# Patient Record
Sex: FEMALE | Race: WHITE | NOT HISPANIC OR LATINO | URBAN - NONMETROPOLITAN AREA
[De-identification: names, ages, dates, MRNs, and addresses within clinical notes are randomized per-mention and may not be internally consistent; named-entity substitution may affect disease eponyms.]

---

## 2019-02-20 ENCOUNTER — IMPORTED ENCOUNTER (OUTPATIENT)
Dept: URBAN - NONMETROPOLITAN AREA CLINIC 1 | Facility: CLINIC | Age: 59
End: 2019-02-20

## 2019-02-20 PROBLEM — H25.813: Noted: 2018-02-14

## 2019-02-20 PROBLEM — H52.4: Noted: 2017-11-01

## 2019-02-20 PROBLEM — H52.03: Noted: 2019-02-20

## 2019-02-20 PROBLEM — H40.1131: Noted: 2017-11-01

## 2019-02-20 PROBLEM — H04.123: Noted: 2019-02-20

## 2019-02-20 PROCEDURE — 92014 COMPRE OPH EXAM EST PT 1/>: CPT

## 2019-02-20 PROCEDURE — 92015 DETERMINE REFRACTIVE STATE: CPT

## 2019-02-20 NOTE — PATIENT DISCUSSION
COAG-IOPs 22:20-Appears stable. Reviewed VF from 01/22/18 with patient. -Continue Latanoprost OU qhs as directed. Stressed importance of compliance. (handwritten rx given) -OCT [facility] performed 10/24/19 and reviewed with the patient. Stable OU. Cataract OU-Reviewed symptoms of advancing cataract growth such as glare and halos and decreased vision.-Continue to monitor for now. Pt will notify us if any new symptoms develop. Presbyopia/Hyperopia-specs rx given FUENTES-Explained FUENTES and associated symptoms.-Recommend increasing Omega 3s.-Pt to begin artificial tears Refresh Repair OU QID PRN. -Start Pataday OU qday if eyes feel itchy. - Pt will contact us if this does not provide relief. Consider punctal plugs in that case. 11/1/17  IOP 22:2  Va cc OD 20/25 OS 20/30 refracted to 20/20 OD & OS2/14/18  IOP 22:22  Va cc OD 20/20 OS 20/206/20/18  IOP 21:21  Va cc OD 20/20 OS 20/2010/24/18 IOP 23:22 Va cc OD 20/20 OS 20/202/20/19 IOP 22:20 Va cc OD 20/20 OS 20/20RTC: 4 mo VA & Jacob; 's Notes: Teena Mata

## 2019-07-17 ENCOUNTER — IMPORTED ENCOUNTER (OUTPATIENT)
Dept: URBAN - NONMETROPOLITAN AREA CLINIC 1 | Facility: CLINIC | Age: 59
End: 2019-07-17

## 2019-07-17 PROBLEM — H40.1131: Noted: 2019-07-17

## 2019-07-17 PROBLEM — H52.4: Noted: 2019-07-17

## 2019-07-17 PROBLEM — H52.03: Noted: 2019-07-17

## 2019-07-17 PROBLEM — H04.123: Noted: 2019-07-17

## 2019-07-17 PROBLEM — H25.813: Noted: 2019-07-17

## 2019-07-17 PROBLEM — H00.15: Noted: 2019-07-17

## 2019-07-17 PROCEDURE — 99212 OFFICE O/P EST SF 10 MIN: CPT

## 2019-07-17 NOTE — PATIENT DISCUSSION
Chalazion LLL-Discussed findings of exam in detail with the patient.-Discussed the nature of the condition and treatment options with the patient.-The patient was instructed on the use of warm compresses.-The patient was instructed on the use of lid scrubs. -Discussed surgical incision and drainage of the chalazion.-The patient chooses surgical intervention. COAG-IOP 20OU 07/17/19-Appears stable. -Continue Latanoprost OU qhs as directed. -Stressed importance of compliance. -Continue to monitor. -Order VFCataract OU-Reviewed symptoms of advancing cataract growth such as glare and halos and decreased vision.-Continue to monitor for now. Pt will notify us if any new symptoms develop. FUENTES-Explained FUENTES and associated symptoms.-Recommend increasing Omega 3s.-Pt to begin artificial tears Refresh Repair OU QID PRN. -Start Pataday OU qday if eyes feel itchy. - Pt will contact us if this does not provide relief. Consider punctal plugs in that case. RTC: 4 mo IOP check VF; 's Notes: Ursula Harp

## 2019-11-13 ENCOUNTER — IMPORTED ENCOUNTER (OUTPATIENT)
Dept: URBAN - NONMETROPOLITAN AREA CLINIC 1 | Facility: CLINIC | Age: 59
End: 2019-11-13

## 2019-11-13 PROCEDURE — 92083 EXTENDED VISUAL FIELD XM: CPT

## 2019-11-13 PROCEDURE — 99213 OFFICE O/P EST LOW 20 MIN: CPT

## 2019-11-13 NOTE — PATIENT DISCUSSION
COAG-IOP 20OU 11/13/19-Appears stable. -Continue Latanoprost OU qhs as directed. -Stressed importance of compliance. -Continue to monitor. -VF performed and reviewed w/pt -Order OCT ONHCataract OU-Reviewed symptoms of advancing cataract growth such as glare and halos and decreased vision.-Continue to monitor for now. Pt will notify us if any new symptoms develop. FUENTES-Explained FUENTES and associated symptoms.-Recommend increasing Omega 3s.-Pt to begin artificial tears Refresh Repair OU QID PRN. -Start Pataday OU qday if eyes feel itchy. - Pt will contact us if this does not provide relief. Consider punctal plugs in that case. -Recommend dexomethosone aki for itching unable to get recommend dandruff shampoo for eyebrows when showeringRTC 4 mo CEE OCT ONH; 's Notes: Teena Mata

## 2019-11-14 PROBLEM — H40.1131: Noted: 2019-11-14

## 2019-11-14 PROBLEM — H25.813: Noted: 2019-11-14

## 2019-11-14 PROBLEM — H52.4: Noted: 2019-11-14

## 2019-11-14 PROBLEM — H02.821: Noted: 2021-10-07

## 2019-11-14 PROBLEM — H52.03: Noted: 2019-11-14

## 2019-11-14 PROBLEM — H04.123: Noted: 2019-11-14

## 2020-11-25 ENCOUNTER — IMPORTED ENCOUNTER (OUTPATIENT)
Dept: URBAN - NONMETROPOLITAN AREA CLINIC 1 | Facility: CLINIC | Age: 60
End: 2020-11-25

## 2020-11-25 PROCEDURE — 92015 DETERMINE REFRACTIVE STATE: CPT

## 2020-11-25 PROCEDURE — 92133 CPTRZD OPH DX IMG PST SGM ON: CPT

## 2020-11-25 PROCEDURE — 92014 COMPRE OPH EXAM EST PT 1/>: CPT

## 2020-11-25 NOTE — PATIENT DISCUSSION
COAG-IOP at 22 OD and 19 OS. -Appears stable. -Continue Latanoprost OU qhs as directed. -Stressed importance of compliance. -Continue to monitor. -OCT done today; stable from previous.-Order VF Cataract OU-Reviewed symptoms of advancing cataract growth such as glare and halos and decreased vision.-Continue to monitor for now. Pt will notify us if any new symptoms develop. FUENTES-Explained FUENTES and associated symptoms.-Recommend increasing Omega 3s.-Pt to begin artificial tears Refresh Repair OU QID PRN. -Start Pataday OU qday if eyes feel itchy. - Pt will contact us if this does not provide relief. Consider punctal plugs in that case. -Recommend dexomethosone aki for itching unable to get recommend dandruff shampoo for eyebrows when showeringPresbyopia OU- Discussed refractive status in detail with patient. - new glasses Rx given tody.; 's Notes: Michel Callahan

## 2021-03-16 ENCOUNTER — IMPORTED ENCOUNTER (OUTPATIENT)
Dept: URBAN - NONMETROPOLITAN AREA CLINIC 1 | Facility: CLINIC | Age: 61
End: 2021-03-16

## 2021-03-16 PROCEDURE — 92083 EXTENDED VISUAL FIELD XM: CPT

## 2021-03-16 PROCEDURE — 92012 INTRM OPH EXAM EST PATIENT: CPT

## 2021-03-16 NOTE — PATIENT DISCUSSION
COAG-IOP 22 OU 03/16/21-Appears stable. -Continue Latanoprost OU qhs as directed. -Stressed importance of compliance. -Continue to monitor. -VF performed and reviewed w/ptCataract OU-Reviewed symptoms of advancing cataract growth such as glare and halos and decreased vision.-Continue to monitor for now. Pt will notify us if any new symptoms develop. FUENTES-Explained FUENTES and associated symptoms.-Recommend increasing Omega 3s.-Pt to begin artificial tears Refresh Repair OU QID PRN. -Start Pataday OU qday if eyes feel itchy. - Pt will contact us if this does not provide relief. Consider punctal plugs in that case. -Recommend dexomethosone aki for itching unable to get recommend dandruff shampoo for eyebrows when showeringPresbyopia OU- Discussed refractive status in detail with patient. - new glasses Rx given tody.; 's Notes: Ki Eid

## 2021-10-07 ENCOUNTER — IMPORTED ENCOUNTER (OUTPATIENT)
Dept: URBAN - NONMETROPOLITAN AREA CLINIC 1 | Facility: CLINIC | Age: 61
End: 2021-10-07

## 2021-10-07 PROCEDURE — 92133 CPTRZD OPH DX IMG PST SGM ON: CPT

## 2021-10-07 PROCEDURE — 92014 COMPRE OPH EXAM EST PT 1/>: CPT

## 2021-10-07 PROCEDURE — 76514 ECHO EXAM OF EYE THICKNESS: CPT

## 2021-10-07 PROCEDURE — 92015 DETERMINE REFRACTIVE STATE: CPT

## 2021-10-07 NOTE — PATIENT DISCUSSION
COAG-IOP stable 22/21-Continue Latanoprost OU qhs as directed. -Stressed importance of compliance. -Continue to monitor. -[facility] OCT performed and reviewed stableCataract OU-Reviewed symptoms of advancing cataract growth such as glare and halos and decreased vision.-Continue to monitor for now. Pt will notify us if any new symptoms develop. Cyst RUL-No treatment necessary monitor. Presbyopia OU- Discussed refractive status in detail with patient. - new glasses Rx given tody.; 's Notes: Adonis Jordan

## 2022-04-09 ASSESSMENT — VISUAL ACUITY
OD_SC: 20/20
OD_SC: 20/25+2
OD_SC: 20/20
OD_SC: 20/20
OS_SC: 20/20
OD_SC: 20/20
OD_SC: 20/20

## 2022-04-09 ASSESSMENT — TONOMETRY
OD_IOP_MMHG: 20
OD_IOP_MMHG: 22
OS_IOP_MMHG: 20
OD_IOP_MMHG: 22
OD_IOP_MMHG: 20
OS_IOP_MMHG: 21
OS_IOP_MMHG: 20
OS_IOP_MMHG: 22
OS_IOP_MMHG: 20
OD_IOP_MMHG: 22
OS_IOP_MMHG: 19
OD_IOP_MMHG: 22

## 2022-04-09 ASSESSMENT — PACHYMETRY
OS_CT_UM: 536; ADJ: THIN; INDICATION: 365.11	GLAUCOMA OPEN P
OD_CT_UM: 540; ADJ: THIN; INDICATION: 365.11	GLAUCOMA OPEN P

## 2022-05-09 ENCOUNTER — ESTABLISHED PATIENT (OUTPATIENT)
Dept: RURAL CLINIC 2 | Facility: CLINIC | Age: 62
End: 2022-05-09

## 2022-05-09 ENCOUNTER — EMERGENCY VISIT (OUTPATIENT)
Dept: RURAL CLINIC 2 | Facility: CLINIC | Age: 62
End: 2022-05-09

## 2022-05-09 DIAGNOSIS — H40.1131: ICD-10-CM

## 2022-05-09 DIAGNOSIS — H25.813: ICD-10-CM

## 2022-05-09 PROCEDURE — 92012 INTRM OPH EXAM EST PATIENT: CPT

## 2022-05-09 PROCEDURE — 99213 OFFICE O/P EST LOW 20 MIN: CPT

## 2022-05-09 PROCEDURE — 92083 EXTENDED VISUAL FIELD XM: CPT

## 2022-05-09 ASSESSMENT — VISUAL ACUITY
OS_CC: 20/20
OS_CC: 20/20
OD_PH: 20/40
OD_CC: 20/80
OD_CC: 20/20

## 2022-05-09 ASSESSMENT — TONOMETRY
OD_IOP_MMHG: 16
OS_IOP_MMHG: 17

## 2022-05-09 NOTE — PATIENT DISCUSSION
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  Start ALREX TID OU until 2 sample bottles gone and REFRESH RELIEVA PF q2hrs.  Call back if NI within 24 hrs.

## 2022-11-21 ENCOUNTER — ESTABLISHED PATIENT (OUTPATIENT)
Dept: RURAL CLINIC 2 | Facility: CLINIC | Age: 62
End: 2022-11-21

## 2022-11-21 DIAGNOSIS — H52.223: ICD-10-CM

## 2022-11-21 DIAGNOSIS — H40.1131: ICD-10-CM

## 2022-11-21 DIAGNOSIS — H52.4: ICD-10-CM

## 2022-11-21 DIAGNOSIS — H52.03: ICD-10-CM

## 2022-11-21 DIAGNOSIS — H25.813: ICD-10-CM

## 2022-11-21 PROCEDURE — 92015 DETERMINE REFRACTIVE STATE: CPT

## 2022-11-21 PROCEDURE — 92014 COMPRE OPH EXAM EST PT 1/>: CPT

## 2022-11-21 PROCEDURE — 92133 CPTRZD OPH DX IMG PST SGM ON: CPT

## 2022-11-21 ASSESSMENT — VISUAL ACUITY
OS_CC: 20/20
OD_CC: 20/20

## 2022-11-21 ASSESSMENT — TONOMETRY
OD_IOP_MMHG: 21
OS_IOP_MMHG: 21

## 2022-11-21 NOTE — PATIENT DISCUSSION
Patient given Rx for glasses.  Wants new RX as close to possible as old (feels most recent was too strong).

## 2023-05-22 ENCOUNTER — ESTABLISHED PATIENT (OUTPATIENT)
Dept: RURAL CLINIC 2 | Facility: CLINIC | Age: 63
End: 2023-05-22

## 2023-05-22 DIAGNOSIS — H40.1131: ICD-10-CM

## 2023-05-22 DIAGNOSIS — H25.813: ICD-10-CM

## 2023-05-22 PROCEDURE — 92020 GONIOSCOPY: CPT

## 2023-05-22 PROCEDURE — 99213 OFFICE O/P EST LOW 20 MIN: CPT

## 2023-05-22 PROCEDURE — 92083 EXTENDED VISUAL FIELD XM: CPT

## 2023-05-22 ASSESSMENT — VISUAL ACUITY
OD_CC: 20/20
OS_CC: 20/20

## 2023-05-22 ASSESSMENT — TONOMETRY
OD_IOP_MMHG: 17
OS_IOP_MMHG: 17

## 2024-02-12 ENCOUNTER — ESTABLISHED PATIENT (OUTPATIENT)
Dept: RURAL CLINIC 2 | Facility: CLINIC | Age: 64
End: 2024-02-12

## 2024-02-12 DIAGNOSIS — H52.4: ICD-10-CM

## 2024-02-12 DIAGNOSIS — H25.813: ICD-10-CM

## 2024-02-12 DIAGNOSIS — H52.223: ICD-10-CM

## 2024-02-12 DIAGNOSIS — H40.1131: ICD-10-CM

## 2024-02-12 DIAGNOSIS — H52.03: ICD-10-CM

## 2024-02-12 PROCEDURE — 92015 DETERMINE REFRACTIVE STATE: CPT

## 2024-02-12 PROCEDURE — 92133 CPTRZD OPH DX IMG PST SGM ON: CPT

## 2024-02-12 PROCEDURE — 99213 OFFICE O/P EST LOW 20 MIN: CPT

## 2024-02-12 ASSESSMENT — VISUAL ACUITY
OD_CC: 20/20-1
OS_CC: 20/20

## 2024-02-12 ASSESSMENT — TONOMETRY
OD_IOP_MMHG: 16
OS_IOP_MMHG: 16

## 2024-08-12 ENCOUNTER — FOLLOW UP (OUTPATIENT)
Dept: RURAL CLINIC 2 | Facility: CLINIC | Age: 64
End: 2024-08-12

## 2024-08-12 DIAGNOSIS — H25.813: ICD-10-CM

## 2024-08-12 DIAGNOSIS — H40.1131: ICD-10-CM

## 2024-08-12 PROCEDURE — 92083 EXTENDED VISUAL FIELD XM: CPT

## 2024-08-12 PROCEDURE — 99213 OFFICE O/P EST LOW 20 MIN: CPT

## 2024-08-12 ASSESSMENT — TONOMETRY
OD_IOP_MMHG: 16
OS_IOP_MMHG: 16

## 2024-08-12 ASSESSMENT — VISUAL ACUITY
OD_CC: 20/20
OU_CC: 20/20
OS_CC: 20/20

## 2025-03-11 ENCOUNTER — COMPREHENSIVE EXAM (OUTPATIENT)
Age: 65
End: 2025-03-11

## 2025-03-11 DIAGNOSIS — H40.1131: ICD-10-CM

## 2025-03-11 DIAGNOSIS — H52.223: ICD-10-CM

## 2025-03-11 DIAGNOSIS — H52.03: ICD-10-CM

## 2025-03-11 DIAGNOSIS — H52.4: ICD-10-CM

## 2025-03-11 DIAGNOSIS — H25.813: ICD-10-CM

## 2025-03-11 PROCEDURE — 92014 COMPRE OPH EXAM EST PT 1/>: CPT

## 2025-03-11 PROCEDURE — 92015 DETERMINE REFRACTIVE STATE: CPT

## 2025-03-11 PROCEDURE — 92133 CPTRZD OPH DX IMG PST SGM ON: CPT
